# Patient Record
Sex: FEMALE | Race: WHITE | NOT HISPANIC OR LATINO | Employment: FULL TIME | URBAN - METROPOLITAN AREA
[De-identification: names, ages, dates, MRNs, and addresses within clinical notes are randomized per-mention and may not be internally consistent; named-entity substitution may affect disease eponyms.]

---

## 2023-02-24 ENCOUNTER — OFFICE VISIT (OUTPATIENT)
Dept: URGENT CARE | Facility: CLINIC | Age: 58
End: 2023-02-24
Payer: COMMERCIAL

## 2023-02-24 VITALS
DIASTOLIC BLOOD PRESSURE: 73 MMHG | SYSTOLIC BLOOD PRESSURE: 103 MMHG | RESPIRATION RATE: 19 BRPM | HEIGHT: 66 IN | WEIGHT: 200 LBS | TEMPERATURE: 99 F | OXYGEN SATURATION: 95 % | BODY MASS INDEX: 32.14 KG/M2 | HEART RATE: 90 BPM

## 2023-02-24 DIAGNOSIS — J00 ACUTE RHINITIS: ICD-10-CM

## 2023-02-24 DIAGNOSIS — J02.0 STREP PHARYNGITIS: Primary | ICD-10-CM

## 2023-02-24 DIAGNOSIS — J02.9 SORE THROAT: ICD-10-CM

## 2023-02-24 LAB
CTP QC/QA: YES
CTP QC/QA: YES
MOLECULAR STREP A: POSITIVE
SARS-COV-2 AG RESP QL IA.RAPID: NEGATIVE

## 2023-02-24 PROCEDURE — 3008F PR BODY MASS INDEX (BMI) DOCUMENTED: ICD-10-PCS | Mod: CPTII,S$GLB,, | Performed by: EMERGENCY MEDICINE

## 2023-02-24 PROCEDURE — 1160F PR REVIEW ALL MEDS BY PRESCRIBER/CLIN PHARMACIST DOCUMENTED: ICD-10-PCS | Mod: CPTII,S$GLB,, | Performed by: EMERGENCY MEDICINE

## 2023-02-24 PROCEDURE — 3078F PR MOST RECENT DIASTOLIC BLOOD PRESSURE < 80 MM HG: ICD-10-PCS | Mod: CPTII,S$GLB,, | Performed by: EMERGENCY MEDICINE

## 2023-02-24 PROCEDURE — 99214 PR OFFICE/OUTPT VISIT, EST, LEVL IV, 30-39 MIN: ICD-10-PCS | Mod: S$GLB,,, | Performed by: EMERGENCY MEDICINE

## 2023-02-24 PROCEDURE — 1159F MED LIST DOCD IN RCRD: CPT | Mod: CPTII,S$GLB,, | Performed by: EMERGENCY MEDICINE

## 2023-02-24 PROCEDURE — 87651 STREP A DNA AMP PROBE: CPT | Mod: QW,S$GLB,, | Performed by: EMERGENCY MEDICINE

## 2023-02-24 PROCEDURE — 3078F DIAST BP <80 MM HG: CPT | Mod: CPTII,S$GLB,, | Performed by: EMERGENCY MEDICINE

## 2023-02-24 PROCEDURE — 1159F PR MEDICATION LIST DOCUMENTED IN MEDICAL RECORD: ICD-10-PCS | Mod: CPTII,S$GLB,, | Performed by: EMERGENCY MEDICINE

## 2023-02-24 PROCEDURE — 3074F SYST BP LT 130 MM HG: CPT | Mod: CPTII,S$GLB,, | Performed by: EMERGENCY MEDICINE

## 2023-02-24 PROCEDURE — 3074F PR MOST RECENT SYSTOLIC BLOOD PRESSURE < 130 MM HG: ICD-10-PCS | Mod: CPTII,S$GLB,, | Performed by: EMERGENCY MEDICINE

## 2023-02-24 PROCEDURE — 1160F RVW MEDS BY RX/DR IN RCRD: CPT | Mod: CPTII,S$GLB,, | Performed by: EMERGENCY MEDICINE

## 2023-02-24 PROCEDURE — 87651 POCT STREP A MOLECULAR: ICD-10-PCS | Mod: QW,S$GLB,, | Performed by: EMERGENCY MEDICINE

## 2023-02-24 PROCEDURE — 3008F BODY MASS INDEX DOCD: CPT | Mod: CPTII,S$GLB,, | Performed by: EMERGENCY MEDICINE

## 2023-02-24 PROCEDURE — 87811 SARS CORONAVIRUS 2 ANTIGEN POCT, MANUAL READ: ICD-10-PCS | Mod: QW,S$GLB,, | Performed by: EMERGENCY MEDICINE

## 2023-02-24 PROCEDURE — 87811 SARS-COV-2 COVID19 W/OPTIC: CPT | Mod: QW,S$GLB,, | Performed by: EMERGENCY MEDICINE

## 2023-02-24 PROCEDURE — 99214 OFFICE O/P EST MOD 30 MIN: CPT | Mod: S$GLB,,, | Performed by: EMERGENCY MEDICINE

## 2023-02-24 RX ORDER — FLUCONAZOLE 150 MG/1
150 TABLET ORAL DAILY
Qty: 2 TABLET | Refills: 0 | Status: SHIPPED | OUTPATIENT
Start: 2023-02-24 | End: 2023-02-26

## 2023-02-24 RX ORDER — AMOXICILLIN 500 MG/1
500 CAPSULE ORAL EVERY 12 HOURS
Qty: 20 CAPSULE | Refills: 0 | Status: SHIPPED | OUTPATIENT
Start: 2023-02-24 | End: 2023-03-06

## 2023-02-24 RX ORDER — LEVOTHYROXINE SODIUM 100 UG/1
100 TABLET ORAL
COMMUNITY

## 2023-02-24 RX ORDER — INDAPAMIDE 1.25 MG/1
1.25 TABLET ORAL
COMMUNITY
Start: 2023-01-18

## 2023-02-24 NOTE — PROGRESS NOTES
Subjective:       Patient ID: Betzaida Maria is a 57 y.o. female.    Vitals:  vitals were not taken for this visit.     Chief Complaint: Sore Throat    Patient presents to clinic with sinus drainage/ pressure, sore throat, x3 days, patient lives in [place] and returned to [place] Sunday to visit.  Daughter recently with strep throat.  No known fever.    Sore Throat   This is a new problem. The current episode started in the past 7 days. The problem has been gradually worsening. The pain is worse on the left side. There has been no fever. The pain is at a severity of 6/10. The pain is mild. Associated symptoms include congestion, coughing, headaches, swollen glands and trouble swallowing. Treatments tried: zyrtec.     Constitution: Positive for fatigue. Negative for fever.   HENT:  Positive for congestion, postnasal drip, sore throat and trouble swallowing.    Respiratory:  Positive for cough.    Neurological:  Positive for headaches.     Objective:      Physical Exam   Constitutional: She is oriented to person, place, and time. She appears well-developed. She is cooperative.  Non-toxic appearance. She does not appear ill. No distress.   HENT:   Head: Normocephalic and atraumatic.   Ears:   Right Ear: Hearing and external ear normal.   Left Ear: Hearing and external ear normal.   Nose: Congestion present. No mucosal edema, rhinorrhea or nasal deformity. No epistaxis. Right sinus exhibits no maxillary sinus tenderness and no frontal sinus tenderness. Left sinus exhibits no maxillary sinus tenderness and no frontal sinus tenderness.   Mouth/Throat: Uvula is midline and mucous membranes are normal. No trismus in the jaw. Normal dentition. No uvula swelling. Posterior oropharyngeal erythema present. No oropharyngeal exudate or posterior oropharyngeal edema.   Eyes: Conjunctivae and lids are normal. No scleral icterus. Extraocular movement intact   Neck: Trachea normal and phonation normal. Neck supple. No edema present. No  erythema present. No neck rigidity present.   Cardiovascular: Normal rate, regular rhythm and normal pulses.   Pulmonary/Chest: Effort normal. She has no decreased breath sounds.   Abdominal: Normal appearance.   Musculoskeletal: Normal range of motion.         General: No deformity. Normal range of motion.   Lymphadenopathy:     She has cervical adenopathy (Left-sided anterior cervical).   Neurological: She is alert and oriented to person, place, and time. She exhibits normal muscle tone. Coordination normal.   Skin: Skin is warm, dry, intact, not diaphoretic and not pale.   Psychiatric: Her speech is normal and behavior is normal. Judgment and thought content normal.   Nursing note and vitals reviewed.      Assessment:       No diagnosis found.      Plan:         There are no diagnoses linked to this encounter.

## 2023-02-25 NOTE — PATIENT INSTRUCTIONS
Amoxicillin as prescribed.  Take the entire 10 days.  Diflucan as prescribed if you develop a yeast infection.  Warm saltwater gargles.  Do this 2 to 3 times a day  Tylenol or Motrin for fever or pain per label instructions.  Avoid mouth-to-mouth contact including kissing mouth, sharing utensils or drinks.  You are contagious until you have been on antibiotics for over 24 hr.  Change your toothbrush on day 3 of antibiotics.       Follow up with your PCP in 2-3 days if symptoms are not improving.  ER for any worsening, particularly increase in swelling.    Use over the counter(OTC) antihistamine like claritin or zyrtec daily.  Flonase: 2 sprays per nostril 1-2 times a day.   Nasal saline drops: 2-4 drops per nostril 10-15 times a day.   Tylenol or Motrin for fever or pain per label instructions.  Consider use of OTC cough medicine like Robitussin DM.  Avoid OTC decongestants if you have high blood pressure. This includes multi-cold symptom preparations.    Follow up in 2-3 days with PCP if no improvement or any worsening.     Seasonal Allergies Discharge Instructions   About this topic   Seasonal allergies are also called allergic rhinitis or hay fever. You may have sneezing; a stuffy or runny nose; or itchy throat, ears, or eyes. You may feel very tired. Most often, this problem is caused by:  Pollens from trees, grasses, or weeds.  Mold spores that grow when the air is humid, wet, or damp.  Some people can breathe in these things and have no problems. But when you have a seasonal allergy, your body acts as if the substance is harming you. Then you have symptoms. You may have symptoms only at some times of the year. If your symptoms last all year, they may be caused by:  Insects, such as dust mites or cock roaches.  Animals, such as cats or dogs.  Mold spores.     What care is needed at home?   Ask your doctor what you need to do when you go home. Make sure you ask questions if you do not understand what the doctor  says.  Rinse your nose with salt water to get rid of pollen inside of your nose.  Keep the windows closed and use air conditioning.  Shower before bed to rinse pollen from your hair and skin.  Wear a dust mask if you need to be outside.  Use over-the-counter medicines to help with your symptoms:  A steroid nose spray can help with a stuffy nose. It can also help with drainage down the back of your throat.  An antihistamine can help with itching, sneezing, or runny nose.  An antihistamine eye drop can help with itchy eyes.  A decongestant can help with a stuffy nose. Talk with your doctor or pharmacist about your other health problems before you use this kind of medicine. It may not be safe for people with high blood pressure.  What follow-up care is needed?   Your doctor may ask you to make visits to the office to check on your progress. Your doctor may ask you to see an allergy specialist, or to have testing done to learn what is causing your allergies. Be sure to keep these visits.  What drugs may be needed?   The doctor may order drugs to treat the signs. Take your drugs as ordered. You may also take allergy shots if you have had allergy tests.  Will physical activity be limited?   You may have to limit your activity. If your health problem is caused by an allergy to pollens or molds, you may want to limit your time outdoors. Talk to your doctor about what activities are best for you.  What problems could happen?   Your signs may not get better with your drugs  Asthma may get worse  Sinus infection  What can be done to prevent this health problem?   Try to avoid allergens.  If you are allergic to pollens, grasses, trees, etc:  Stay inside in the morning when pollen counts are high.  Avoid going outside when the trees, grasses, or weeds are blooming.  Keep the windows of your house and car closed.  Use an air conditioner.  If you are allergic to dust, molds, dust mites, pets, etc:  Clean your air conditioner or  furnace filters regularly.  Cover pillows and mattresses with vinyl covers to lower your exposure to dust mites.  Wash bedding weekly in very hot water.  Use fewer dust-collecting items, such as curtains, bed skirts, carpeting, and stuffed animals, mainly in your bedroom.  If you can't avoid having a furry pet, vacuum often and keep your pet out of bedrooms and other rooms with carpets.  When do I need to call the doctor?   You are having so much trouble breathing that you can only say one or two words at a time.  You need to sit upright at all times to be able to breathe and or cannot lie down.  You have severe swelling of your tongue, lips, or mouth.  You have trouble breathing when talking or sitting still.  You have a fever of 100.4°F (38°C) or higher.  You have green or yellow mucus.  Teach Back: Helping You Understand   The Teach Back Method helps you understand the information we are giving you. After you talk with the staff, tell them in your own words what you learned. This helps to make sure the staff has described each thing clearly. It also helps to explain things that may have been confusing. Before going home, make sure you can do these:  I can tell you about my condition.  I can tell you what may help make the air .  I can tell you what may help ease my allergies.  Where can I learn more?   Food and Drug Administration  https://www.fda.gov/ForConsumers/ConsumerUpdates/nio326098.htm   NHS Choices  https://www.nhs.uk/conditions/Hay-fever/   Last Reviewed Date   2021-06-21  Consumer Information Use and Disclaimer   This information is not specific medical advice and does not replace information you receive from your health care provider. This is only a brief summary of general information. It does NOT include all information about conditions, illnesses, injuries, tests, procedures, treatments, therapies, discharge instructions or life-style choices that may apply to you. You must talk with your  health care provider for complete information about your health and treatment options. This information should not be used to decide whether or not to accept your health care providers advice, instructions or recommendations. Only your health care provider has the knowledge and training to provide advice that is right for you.  Copyright   Copyright © 2021 [employer] and its affiliates and/or licensors. All rights reserved.         Pharyngitis: Strep (Confirmed)    You have had a positive test for strep throat. Strep throat is a contagious illness. It is spread by coughing, kissing or by touching others after touching your mouth or nose. Symptoms include throat pain that is worse with swallowing, aching all over, headache, and fever. It is treated with antibiotic medicine. This should help you start to feel better in 1 to 2 days.  Home care  Rest at home. Drink plenty of fluids to you won't get dehydrated.  No work or school for the first 2 days of taking the antibiotics. After this time, you will not be contagious. You can then return to school or work if you are feeling better.   Take antibiotic medicine for the full 10 days, even if you feel better. This is very important to ensure the infection is treated. It is also important to prevent medicine-resistant germs from developing. If you were given an antibiotic shot, you don't need any more antibiotics.  You may use acetaminophen or ibuprofen to control pain or fever, unless another medicine was prescribed for this. Talk with your doctor before taking these medicines if you have chronic liver or kidney disease. Also talk with your doctor if you have had a stomach ulcer or GI bleeding.  Throat lozenges or sprays help reduce pain. Gargling with warm saltwater will also reduce throat pain. Dissolve 1/2 teaspoon of salt in 1 glass of warm water. This may be useful just before meals.   Soft foods are OK. Avoid salty or spicy foods.  Follow-up care  Follow up with  your healthcare provider or our staff if you don't get better over the next week.  When to seek medical advice  Call your healthcare provider right away if any of these occur:  Fever of 100.4ºF (38ºC) or higher, or as directed by your healthcare provider  New or worsening ear pain, sinus pain, or headache  Painful lumps in the back of neck  Stiff neck  Lymph nodes getting larger or becoming soft in the middle  You can't swallow liquids or you can't open your mouth wide because of throat pain  Signs of dehydration. These include very dark urine or no urine, sunken eyes, and dizziness.  Trouble breathing or noisy breathing  Muffled voice  Rash  Date Last Reviewed: 4/13/2015  © 4800-9806 [employer]. [address] [place] [address], [place] [address]. All rights reserved. This information is not intended as a substitute for professional medical care. Always follow your healthcare professional's instructions.